# Patient Record
Sex: FEMALE | Race: BLACK OR AFRICAN AMERICAN | NOT HISPANIC OR LATINO | URBAN - METROPOLITAN AREA
[De-identification: names, ages, dates, MRNs, and addresses within clinical notes are randomized per-mention and may not be internally consistent; named-entity substitution may affect disease eponyms.]

---

## 2017-03-22 ENCOUNTER — EMERGENCY (EMERGENCY)
Facility: HOSPITAL | Age: 32
LOS: 1 days | Discharge: HOME | End: 2017-03-22
Attending: EMERGENCY MEDICINE | Admitting: EMERGENCY MEDICINE
Payer: COMMERCIAL

## 2017-03-22 VITALS
TEMPERATURE: 98 F | HEART RATE: 101 BPM | DIASTOLIC BLOOD PRESSURE: 75 MMHG | OXYGEN SATURATION: 96 % | SYSTOLIC BLOOD PRESSURE: 141 MMHG | RESPIRATION RATE: 18 BRPM

## 2017-03-22 VITALS
RESPIRATION RATE: 19 BRPM | SYSTOLIC BLOOD PRESSURE: 120 MMHG | OXYGEN SATURATION: 95 % | HEART RATE: 88 BPM | DIASTOLIC BLOOD PRESSURE: 75 MMHG | TEMPERATURE: 98 F

## 2017-03-22 DIAGNOSIS — Z79.899 OTHER LONG TERM (CURRENT) DRUG THERAPY: ICD-10-CM

## 2017-03-22 DIAGNOSIS — O26.893 OTHER SPECIFIED PREGNANCY RELATED CONDITIONS, THIRD TRIMESTER: ICD-10-CM

## 2017-03-22 DIAGNOSIS — Z88.8 ALLERGY STATUS TO OTHER DRUGS, MEDICAMENTS AND BIOLOGICAL SUBSTANCES STATUS: ICD-10-CM

## 2017-03-22 DIAGNOSIS — Z3A.40 40 WEEKS GESTATION OF PREGNANCY: ICD-10-CM

## 2017-03-22 DIAGNOSIS — J02.8 ACUTE PHARYNGITIS DUE TO OTHER SPECIFIED ORGANISMS: ICD-10-CM

## 2017-03-22 PROCEDURE — 87880 STREP A ASSAY W/OPTIC: CPT

## 2017-03-22 PROCEDURE — 99282 EMERGENCY DEPT VISIT SF MDM: CPT

## 2017-03-22 PROCEDURE — 99283 EMERGENCY DEPT VISIT LOW MDM: CPT

## 2017-03-22 PROCEDURE — 87081 CULTURE SCREEN ONLY: CPT

## 2017-03-22 NOTE — ED PROVIDER NOTE - OBJECTIVE STATEMENT
31 yo female 40 weeks GA, EDC 03/19/2017, in the ER c/o sore throat x 1 month. pt c/o pain with swallowing. Pt reports cough and chills at first that went away, but pain in her throat persist. Pt denies any SOB, chest pain, abdominal or pelvic pain, denies vaginal bleeding. Pt states that she has an appointment at 1 pm today with her OB/GYN, but since she came earlier decided to check her throat in ER.

## 2017-03-22 NOTE — ED ADULT NURSE NOTE - OBJECTIVE STATEMENT
40wks pregnant c/o sorethroat x 2 weeks.  Denies recent travels, fever, chills, drooling, abdo pain, bleeding, sob.  Speaking clearly in full sentences.

## 2017-03-22 NOTE — ED PROVIDER NOTE - MEDICAL DECISION MAKING DETAILS
33 yo female , EDC 16, in the ER with chronic sore throat x 1 month. Pt appears well, speaks in full sentences, and has no hoarseness, no drooling or stridor, no SOB, clear lungs. will check for strep , although unlikely and d.c home for further OB/GYN evaluation today as scheduled.

## 2017-03-23 ENCOUNTER — INPATIENT (INPATIENT)
Facility: HOSPITAL | Age: 32
LOS: 3 days | Discharge: HOME | End: 2017-03-27
Attending: OBSTETRICS & GYNECOLOGY | Admitting: OBSTETRICS & GYNECOLOGY
Payer: COMMERCIAL

## 2017-03-23 VITALS — WEIGHT: 279.99 LBS | HEIGHT: 65 IN

## 2017-03-23 RX ORDER — CITRIC ACID/SODIUM CITRATE 300-500 MG
30 SOLUTION, ORAL ORAL ONCE
Qty: 0 | Refills: 0 | Status: COMPLETED | OUTPATIENT
Start: 2017-03-23 | End: 2017-03-23

## 2017-03-23 RX ORDER — SODIUM CHLORIDE 9 MG/ML
1000 INJECTION, SOLUTION INTRAVENOUS
Qty: 0 | Refills: 0 | Status: DISCONTINUED | OUTPATIENT
Start: 2017-03-23 | End: 2017-03-24

## 2017-03-23 RX ORDER — METOCLOPRAMIDE HCL 10 MG
10 TABLET ORAL ONCE
Qty: 0 | Refills: 0 | Status: DISCONTINUED | OUTPATIENT
Start: 2017-03-23 | End: 2017-03-27

## 2017-03-23 RX ORDER — OXYCODONE HYDROCHLORIDE 5 MG/1
5 TABLET ORAL
Qty: 0 | Refills: 0 | Status: DISCONTINUED | OUTPATIENT
Start: 2017-03-23 | End: 2017-03-27

## 2017-03-23 RX ORDER — SODIUM CHLORIDE 9 MG/ML
1000 INJECTION, SOLUTION INTRAVENOUS
Qty: 0 | Refills: 0 | Status: DISCONTINUED | OUTPATIENT
Start: 2017-03-23 | End: 2017-03-27

## 2017-03-23 RX ORDER — CEFAZOLIN SODIUM 1 G
2000 VIAL (EA) INJECTION ONCE
Qty: 0 | Refills: 0 | Status: COMPLETED | OUTPATIENT
Start: 2017-03-23 | End: 2017-03-23

## 2017-03-23 RX ORDER — OXYCODONE HYDROCHLORIDE 5 MG/1
10 TABLET ORAL
Qty: 0 | Refills: 0 | Status: DISCONTINUED | OUTPATIENT
Start: 2017-03-23 | End: 2017-03-27

## 2017-03-23 RX ORDER — ONDANSETRON 8 MG/1
4 TABLET, FILM COATED ORAL EVERY 6 HOURS
Qty: 0 | Refills: 0 | Status: DISCONTINUED | OUTPATIENT
Start: 2017-03-23 | End: 2017-03-27

## 2017-03-23 RX ORDER — PENICILLIN G POTASSIUM 5000000 [IU]/1
POWDER, FOR SOLUTION INTRAMUSCULAR; INTRAPLEURAL; INTRATHECAL; INTRAVENOUS
Qty: 0 | Refills: 0 | Status: DISCONTINUED | OUTPATIENT
Start: 2017-03-23 | End: 2017-03-24

## 2017-03-23 RX ORDER — OXYTOCIN 10 UNIT/ML
333.33 VIAL (ML) INJECTION
Qty: 20 | Refills: 0 | Status: DISCONTINUED | OUTPATIENT
Start: 2017-03-23 | End: 2017-03-24

## 2017-03-23 RX ORDER — SODIUM CHLORIDE 9 MG/ML
1000 INJECTION, SOLUTION INTRAVENOUS ONCE
Qty: 0 | Refills: 0 | Status: DISCONTINUED | OUTPATIENT
Start: 2017-03-23 | End: 2017-03-24

## 2017-03-23 RX ORDER — HYDROMORPHONE HYDROCHLORIDE 2 MG/ML
0.5 INJECTION INTRAMUSCULAR; INTRAVENOUS; SUBCUTANEOUS
Qty: 0 | Refills: 0 | Status: DISCONTINUED | OUTPATIENT
Start: 2017-03-23 | End: 2017-03-27

## 2017-03-23 RX ORDER — NALOXONE HYDROCHLORIDE 4 MG/.1ML
0.1 SPRAY NASAL
Qty: 0 | Refills: 0 | Status: DISCONTINUED | OUTPATIENT
Start: 2017-03-23 | End: 2017-03-27

## 2017-03-23 RX ORDER — CITRIC ACID/SODIUM CITRATE 300-500 MG
30 SOLUTION, ORAL ORAL ONCE
Qty: 0 | Refills: 0 | Status: DISCONTINUED | OUTPATIENT
Start: 2017-03-23 | End: 2017-03-27

## 2017-03-23 RX ORDER — OXYTOCIN 10 UNIT/ML
1 VIAL (ML) INJECTION
Qty: 30 | Refills: 0 | Status: DISCONTINUED | OUTPATIENT
Start: 2017-03-23 | End: 2017-03-27

## 2017-03-23 RX ORDER — CITRIC ACID/SODIUM CITRATE 300-500 MG
15 SOLUTION, ORAL ORAL EVERY 4 HOURS
Qty: 0 | Refills: 0 | Status: DISCONTINUED | OUTPATIENT
Start: 2017-03-23 | End: 2017-03-24

## 2017-03-23 RX ORDER — PENICILLIN G POTASSIUM 5000000 [IU]/1
5 POWDER, FOR SOLUTION INTRAMUSCULAR; INTRAPLEURAL; INTRATHECAL; INTRAVENOUS ONCE
Qty: 0 | Refills: 0 | Status: COMPLETED | OUTPATIENT
Start: 2017-03-23 | End: 2017-03-23

## 2017-03-23 RX ORDER — PENICILLIN G POTASSIUM 5000000 [IU]/1
2.5 POWDER, FOR SOLUTION INTRAMUSCULAR; INTRAPLEURAL; INTRATHECAL; INTRAVENOUS EVERY 4 HOURS
Qty: 0 | Refills: 0 | Status: DISCONTINUED | OUTPATIENT
Start: 2017-03-23 | End: 2017-03-24

## 2017-03-23 RX ORDER — SODIUM CHLORIDE 9 MG/ML
1000 INJECTION, SOLUTION INTRAVENOUS ONCE
Qty: 0 | Refills: 0 | Status: DISCONTINUED | OUTPATIENT
Start: 2017-03-23 | End: 2017-03-27

## 2017-03-23 RX ADMIN — PENICILLIN G POTASSIUM 200 MILLION UNIT(S): 5000000 POWDER, FOR SOLUTION INTRAMUSCULAR; INTRAPLEURAL; INTRATHECAL; INTRAVENOUS at 19:11

## 2017-03-23 RX ADMIN — SODIUM CHLORIDE 125 MILLILITER(S): 9 INJECTION, SOLUTION INTRAVENOUS at 09:00

## 2017-03-23 RX ADMIN — PENICILLIN G POTASSIUM 200 MILLION UNIT(S): 5000000 POWDER, FOR SOLUTION INTRAMUSCULAR; INTRAPLEURAL; INTRATHECAL; INTRAVENOUS at 07:20

## 2017-03-23 RX ADMIN — PENICILLIN G POTASSIUM 200 MILLION UNIT(S): 5000000 POWDER, FOR SOLUTION INTRAMUSCULAR; INTRAPLEURAL; INTRATHECAL; INTRAVENOUS at 16:00

## 2017-03-23 RX ADMIN — Medication 30 MILLILITER(S): at 20:43

## 2017-03-23 RX ADMIN — PENICILLIN G POTASSIUM 200 MILLION UNIT(S): 5000000 POWDER, FOR SOLUTION INTRAMUSCULAR; INTRAPLEURAL; INTRATHECAL; INTRAVENOUS at 11:57

## 2017-03-23 RX ADMIN — Medication 1 MILLIUNIT(S)/MIN: at 11:07

## 2017-03-23 RX ADMIN — SODIUM CHLORIDE 125 MILLILITER(S): 9 INJECTION, SOLUTION INTRAVENOUS at 19:11

## 2017-03-23 RX ADMIN — Medication 100 MILLIGRAM(S): at 20:41

## 2017-03-24 RX ORDER — LANOLIN
1 OINTMENT (GRAM) TOPICAL
Qty: 0 | Refills: 0 | Status: DISCONTINUED | OUTPATIENT
Start: 2017-03-24 | End: 2017-03-27

## 2017-03-24 RX ORDER — IBUPROFEN 200 MG
600 TABLET ORAL EVERY 6 HOURS
Qty: 0 | Refills: 0 | Status: DISCONTINUED | OUTPATIENT
Start: 2017-03-24 | End: 2017-03-27

## 2017-03-24 RX ORDER — OXYTOCIN 10 UNIT/ML
41.67 VIAL (ML) INJECTION
Qty: 20 | Refills: 0 | Status: DISCONTINUED | OUTPATIENT
Start: 2017-03-24 | End: 2017-03-27

## 2017-03-24 RX ORDER — TETANUS TOXOID, REDUCED DIPHTHERIA TOXOID AND ACELLULAR PERTUSSIS VACCINE, ADSORBED 5; 2.5; 8; 8; 2.5 [IU]/.5ML; [IU]/.5ML; UG/.5ML; UG/.5ML; UG/.5ML
0.5 SUSPENSION INTRAMUSCULAR ONCE
Qty: 0 | Refills: 0 | Status: DISCONTINUED | OUTPATIENT
Start: 2017-03-24 | End: 2017-03-27

## 2017-03-24 RX ORDER — SIMETHICONE 80 MG/1
80 TABLET, CHEWABLE ORAL EVERY 4 HOURS
Qty: 0 | Refills: 0 | Status: DISCONTINUED | OUTPATIENT
Start: 2017-03-24 | End: 2017-03-27

## 2017-03-24 RX ORDER — OXYTOCIN 10 UNIT/ML
333.33 VIAL (ML) INJECTION
Qty: 20 | Refills: 0 | Status: DISCONTINUED | OUTPATIENT
Start: 2017-03-24 | End: 2017-03-27

## 2017-03-24 RX ORDER — ACETAMINOPHEN 500 MG
650 TABLET ORAL EVERY 6 HOURS
Qty: 0 | Refills: 0 | Status: DISCONTINUED | OUTPATIENT
Start: 2017-03-24 | End: 2017-03-27

## 2017-03-24 RX ORDER — DOCUSATE SODIUM 100 MG
100 CAPSULE ORAL
Qty: 0 | Refills: 0 | Status: DISCONTINUED | OUTPATIENT
Start: 2017-03-24 | End: 2017-03-27

## 2017-03-24 RX ORDER — GLYCERIN ADULT
1 SUPPOSITORY, RECTAL RECTAL AT BEDTIME
Qty: 0 | Refills: 0 | Status: DISCONTINUED | OUTPATIENT
Start: 2017-03-24 | End: 2017-03-27

## 2017-03-24 RX ORDER — SODIUM CHLORIDE 9 MG/ML
1000 INJECTION, SOLUTION INTRAVENOUS
Qty: 0 | Refills: 0 | Status: DISCONTINUED | OUTPATIENT
Start: 2017-03-24 | End: 2017-03-24

## 2017-03-24 RX ORDER — HEPARIN SODIUM 5000 [USP'U]/ML
5000 INJECTION INTRAVENOUS; SUBCUTANEOUS EVERY 12 HOURS
Qty: 0 | Refills: 0 | Status: DISCONTINUED | OUTPATIENT
Start: 2017-03-24 | End: 2017-03-27

## 2017-03-24 RX ORDER — OXYTOCIN 10 UNIT/ML
41.67 VIAL (ML) INJECTION
Qty: 20 | Refills: 0 | Status: DISCONTINUED | OUTPATIENT
Start: 2017-03-24 | End: 2017-03-24

## 2017-03-24 RX ORDER — HEPARIN SODIUM 5000 [USP'U]/ML
5000 INJECTION INTRAVENOUS; SUBCUTANEOUS EVERY 12 HOURS
Qty: 0 | Refills: 0 | Status: DISCONTINUED | OUTPATIENT
Start: 2017-03-24 | End: 2017-03-24

## 2017-03-24 RX ORDER — FERROUS SULFATE 325(65) MG
325 TABLET ORAL DAILY
Qty: 0 | Refills: 0 | Status: DISCONTINUED | OUTPATIENT
Start: 2017-03-24 | End: 2017-03-27

## 2017-03-24 RX ORDER — SODIUM CHLORIDE 9 MG/ML
1000 INJECTION, SOLUTION INTRAVENOUS
Qty: 0 | Refills: 0 | Status: DISCONTINUED | OUTPATIENT
Start: 2017-03-24 | End: 2017-03-27

## 2017-03-24 RX ADMIN — Medication 100 MILLIGRAM(S): at 09:31

## 2017-03-24 RX ADMIN — OXYCODONE HYDROCHLORIDE 5 MILLIGRAM(S): 5 TABLET ORAL at 22:00

## 2017-03-24 RX ADMIN — Medication 325 MILLIGRAM(S): at 11:54

## 2017-03-24 RX ADMIN — Medication 600 MILLIGRAM(S): at 23:58

## 2017-03-24 RX ADMIN — Medication 100 MILLIGRAM(S): at 21:41

## 2017-03-24 RX ADMIN — Medication 600 MILLIGRAM(S): at 09:31

## 2017-03-24 RX ADMIN — SODIUM CHLORIDE 125 MILLILITER(S): 9 INJECTION, SOLUTION INTRAVENOUS at 11:53

## 2017-03-24 RX ADMIN — HEPARIN SODIUM 5000 UNIT(S): 5000 INJECTION INTRAVENOUS; SUBCUTANEOUS at 09:31

## 2017-03-24 RX ADMIN — Medication 1 TABLET(S): at 09:31

## 2017-03-24 RX ADMIN — Medication 600 MILLIGRAM(S): at 16:50

## 2017-03-24 RX ADMIN — Medication 600 MILLIGRAM(S): at 10:15

## 2017-03-24 RX ADMIN — Medication 600 MILLIGRAM(S): at 17:30

## 2017-03-24 RX ADMIN — Medication 125 MILLIUNIT(S)/MIN: at 01:56

## 2017-03-24 RX ADMIN — HEPARIN SODIUM 5000 UNIT(S): 5000 INJECTION INTRAVENOUS; SUBCUTANEOUS at 22:00

## 2017-03-24 RX ADMIN — OXYCODONE HYDROCHLORIDE 5 MILLIGRAM(S): 5 TABLET ORAL at 21:48

## 2017-03-25 RX ADMIN — Medication 325 MILLIGRAM(S): at 10:13

## 2017-03-25 RX ADMIN — Medication 600 MILLIGRAM(S): at 13:27

## 2017-03-25 RX ADMIN — SIMETHICONE 80 MILLIGRAM(S): 80 TABLET, CHEWABLE ORAL at 07:15

## 2017-03-25 RX ADMIN — Medication 600 MILLIGRAM(S): at 23:00

## 2017-03-25 RX ADMIN — HEPARIN SODIUM 5000 UNIT(S): 5000 INJECTION INTRAVENOUS; SUBCUTANEOUS at 10:12

## 2017-03-25 RX ADMIN — Medication 600 MILLIGRAM(S): at 22:57

## 2017-03-25 RX ADMIN — Medication 600 MILLIGRAM(S): at 07:45

## 2017-03-25 RX ADMIN — Medication 600 MILLIGRAM(S): at 14:15

## 2017-03-25 RX ADMIN — Medication 1 TABLET(S): at 10:13

## 2017-03-25 RX ADMIN — Medication 600 MILLIGRAM(S): at 00:02

## 2017-03-25 RX ADMIN — Medication 600 MILLIGRAM(S): at 07:15

## 2017-03-25 RX ADMIN — HEPARIN SODIUM 5000 UNIT(S): 5000 INJECTION INTRAVENOUS; SUBCUTANEOUS at 22:57

## 2017-03-25 RX ADMIN — SIMETHICONE 80 MILLIGRAM(S): 80 TABLET, CHEWABLE ORAL at 00:05

## 2017-03-26 LAB
HCT VFR BLD CALC: 27.7 % — LOW (ref 34.5–45)
HGB BLD-MCNC: 9.6 G/DL — LOW (ref 11.5–15.5)
MCHC RBC-ENTMCNC: 31.1 PG — SIGNIFICANT CHANGE UP (ref 27–34)
MCHC RBC-ENTMCNC: 34.7 G/DL — SIGNIFICANT CHANGE UP (ref 32–36)
MCV RBC AUTO: 89.6 FL — SIGNIFICANT CHANGE UP (ref 80–100)
PLATELET # BLD AUTO: 176 K/UL — SIGNIFICANT CHANGE UP (ref 150–400)
RBC # BLD: 3.09 M/UL — LOW (ref 3.8–5.2)
RBC # FLD: 12.9 % — SIGNIFICANT CHANGE UP (ref 10.3–16.9)
WBC # BLD: 7.9 K/UL — SIGNIFICANT CHANGE UP (ref 3.8–10.5)
WBC # FLD AUTO: 7.9 K/UL — SIGNIFICANT CHANGE UP (ref 3.8–10.5)

## 2017-03-26 RX ADMIN — Medication 600 MILLIGRAM(S): at 23:59

## 2017-03-26 RX ADMIN — Medication 1 TABLET(S): at 11:12

## 2017-03-26 RX ADMIN — HEPARIN SODIUM 5000 UNIT(S): 5000 INJECTION INTRAVENOUS; SUBCUTANEOUS at 11:12

## 2017-03-26 RX ADMIN — HEPARIN SODIUM 5000 UNIT(S): 5000 INJECTION INTRAVENOUS; SUBCUTANEOUS at 23:46

## 2017-03-26 RX ADMIN — Medication 600 MILLIGRAM(S): at 23:55

## 2017-03-26 RX ADMIN — Medication 1 APPLICATION(S): at 11:12

## 2017-03-26 RX ADMIN — Medication 325 MILLIGRAM(S): at 11:12

## 2017-03-26 RX ADMIN — Medication 600 MILLIGRAM(S): at 23:00

## 2017-03-26 NOTE — PROGRESS NOTE ADULT - ATTENDING COMMENTS
Patient seen and examined. Reviewed wound care, follow up in office, breast feeding etc.   Plan for discharge to home tomorrow  Hemodynamically stable and afebrile.

## 2017-03-27 VITALS
HEART RATE: 80 BPM | OXYGEN SATURATION: 98 % | TEMPERATURE: 98 F | RESPIRATION RATE: 18 BRPM | DIASTOLIC BLOOD PRESSURE: 65 MMHG | SYSTOLIC BLOOD PRESSURE: 105 MMHG

## 2017-03-27 LAB
APPEARANCE UR: CLEAR — SIGNIFICANT CHANGE UP
BILIRUB UR-MCNC: NEGATIVE — SIGNIFICANT CHANGE UP
COLOR SPEC: YELLOW — SIGNIFICANT CHANGE UP
DIFF PNL FLD: (no result)
GLUCOSE UR QL: NEGATIVE — SIGNIFICANT CHANGE UP
KETONES UR-MCNC: NEGATIVE — SIGNIFICANT CHANGE UP
LEUKOCYTE ESTERASE UR-ACNC: NEGATIVE — SIGNIFICANT CHANGE UP
NITRITE UR-MCNC: NEGATIVE — SIGNIFICANT CHANGE UP
PH UR: 6.5 — SIGNIFICANT CHANGE UP (ref 4–8)
PROT UR-MCNC: NEGATIVE MG/DL — SIGNIFICANT CHANGE UP
SP GR SPEC: <=1.005 — SIGNIFICANT CHANGE UP (ref 1–1.03)
UROBILINOGEN FLD QL: 0.2 E.U./DL — SIGNIFICANT CHANGE UP

## 2017-03-27 PROCEDURE — 85025 COMPLETE CBC W/AUTO DIFF WBC: CPT

## 2017-03-27 PROCEDURE — C1889: CPT

## 2017-03-27 PROCEDURE — 83615 LACTATE (LD) (LDH) ENZYME: CPT

## 2017-03-27 PROCEDURE — 80053 COMPREHEN METABOLIC PANEL: CPT

## 2017-03-27 PROCEDURE — 81001 URINALYSIS AUTO W/SCOPE: CPT

## 2017-03-27 PROCEDURE — 86850 RBC ANTIBODY SCREEN: CPT

## 2017-03-27 PROCEDURE — 87086 URINE CULTURE/COLONY COUNT: CPT

## 2017-03-27 PROCEDURE — 85027 COMPLETE CBC AUTOMATED: CPT

## 2017-03-27 PROCEDURE — 85730 THROMBOPLASTIN TIME PARTIAL: CPT

## 2017-03-27 PROCEDURE — 85384 FIBRINOGEN ACTIVITY: CPT

## 2017-03-27 PROCEDURE — 36415 COLL VENOUS BLD VENIPUNCTURE: CPT

## 2017-03-27 PROCEDURE — 86780 TREPONEMA PALLIDUM: CPT

## 2017-03-27 PROCEDURE — 84550 ASSAY OF BLOOD/URIC ACID: CPT

## 2017-03-27 PROCEDURE — 86900 BLOOD TYPING SEROLOGIC ABO: CPT

## 2017-03-27 PROCEDURE — 81003 URINALYSIS AUTO W/O SCOPE: CPT

## 2017-03-27 PROCEDURE — 99214 OFFICE O/P EST MOD 30 MIN: CPT

## 2017-03-27 PROCEDURE — 86901 BLOOD TYPING SEROLOGIC RH(D): CPT

## 2017-03-27 PROCEDURE — 88307 TISSUE EXAM BY PATHOLOGIST: CPT

## 2017-03-27 PROCEDURE — 85610 PROTHROMBIN TIME: CPT

## 2017-03-27 RX ADMIN — Medication 325 MILLIGRAM(S): at 12:23

## 2017-03-27 RX ADMIN — Medication 600 MILLIGRAM(S): at 13:40

## 2017-03-27 RX ADMIN — Medication 600 MILLIGRAM(S): at 12:23

## 2017-03-27 RX ADMIN — Medication 1 TABLET(S): at 12:23

## 2017-03-27 RX ADMIN — Medication 600 MILLIGRAM(S): at 06:55

## 2017-03-27 RX ADMIN — Medication 600 MILLIGRAM(S): at 06:00

## 2017-03-27 NOTE — DISCHARGE NOTE OB - PATIENT PORTAL LINK FT
“You can access the FollowHealth Patient Portal, offered by [facility], by registering with the following website: [email or web address]”

## 2017-03-27 NOTE — DISCHARGE NOTE OB - MEDICATION SUMMARY - MEDICATIONS TO TAKE
I will START or STAY ON the medications listed below when I get home from the hospital:    Percocet 5/325 oral tablet  -- 1-2 tab(s) by mouth every 6 hours, As Needed -for severe pain MDD:8 tabs  -- Caution federal law prohibits the transfer of this drug to any person other  than the person for whom it was prescribed.  May cause drowsiness.  Alcohol may intensify this effect.  Use care when operating dangerous machinery.  This prescription cannot be refilled.  This product contains acetaminophen.  Do not use  with any other product containing acetaminophen to prevent possible liver damage.  Using more of this medication than prescribed may cause serious breathing problems.    -- Indication: For Pain

## 2017-03-27 NOTE — DISCHARGE NOTE OB - CARE PROVIDER_API CALL
Beryl Goldberg), [facility]  [address] [place] [address]  Phone: [phone]  Fax: [phone]

## 2017-03-27 NOTE — DISCHARGE NOTE OB - PLAN OF CARE
healing Follow up with your doctor in 1-2 wks. Call her if you have severe pain/bleeding or fever >100.4.

## 2017-03-27 NOTE — DISCHARGE NOTE OB - CARE PLAN
Principal Discharge DX:	 delivery delivered  Goal:	healing  Instructions for follow-up, activity and diet:	Follow up with your doctor in 1-2 wks. Call her if you have severe pain/bleeding or fever >100.4.

## 2017-03-27 NOTE — PROGRESS NOTE ADULT - SUBJECTIVE AND OBJECTIVE BOX
Patient evaluated at bedside.   She reports pain is well controlled with Motrin.  She denies headache, dizziness, chest pain, palpitations, shortness of breathe, nausea, vomiting or heavy vaginal bleeding.  She has been ambulating without assistance, voiding spontaneously, passing gas, tolerating regular diet and is breastfeeding.    Physical Exam:  Vital Signs Last 24 Hrs  T(C): 36.7, Max: 36.7 (03-25 @ 10:00)  T(F): 98, Max: 978 (03-25 @ 20:39)  HR: 91 (85 - 94)  BP: 122/78 (108/71 - 136/63)  BP(mean): --  RR: 14 (14 - 16)  SpO2: 97% (97% - 100%)      GA: NAD, A+0 x 3  CV: RRR  Pulm: Normal work of breathing  Breasts: soft, nontender, no palpable masses  Abd: + BS, soft, nontender, nondistended, no rebound or guarding, uterus firm at midline, 1 fb below umbilicus  Incision: MARLYS dressing in place, c/d/i  : lochia WNL  Extremities: no swelling or calf tenderness
Patient evaluated at bedside.   She reports pain is well controlled with PO meds. She denies headache, dizziness, chest pain, palpitations, shortness of breathe, nausea, vomiting or heavy vaginal bleeding.  She has been out bed with assistance,  passing gas, tolerating regular diet and is breastfeeding.    Physical Exam:  Vital Signs Last 24 Hrs  T(C): 36.6, Max: 37.5 (03-24 @ 00:04)  T(F): 97.9, Max: 99.5 (03-24 @ 00:04)  HR: 87 (78 - 97)  BP: 115/57 (99/64 - 149/88)  BP(mean): --  RR: 16 (16 - 18)  SpO2: 96% (94% - 97%)    GA: NAD, A+0 x 3   Breasts: soft, nontender, no palpable masses b/l  Abd: + BS, soft, nontender, nondistended, no rebound or guarding, incision clean, dry and intact, uterus firm at midline, 1 b below umbilicus  : lochia WNL  Iniguez: d/c  Extremities: no swelling or calf tenderness, reflexes +2 bilaterally    LABS:                        12.5   10.8  )-----------( 242      ( 23 Mar 2017 06:47 )             35.4     23 Mar 2017 06:47    135    |  102    |  8      ----------------------------<  90     3.7     |  20     |  0.45     Ca    9.0        23 Mar 2017 06:47    TPro  7.2    /  Alb  2.7    /  TBili  0.2    /  DBili  x      /  AST  13     /  ALT  20     /  AlkPhos  148    23 Mar 2017 06:47      PT/INR - ( 23 Mar 2017 06:48 )   PT: 10.1 sec;   INR: 0.91          PTT - ( 23 Mar 2017 06:48 )  PTT:28.1 sec
Patient evaluated at bedside.   She reports pain is well controlled with PO motrin.  She denies nausea, vomiting or heavy vaginal bleeding.  She has been ambulating without assistance, voiding spontaneously, passing gas, tolerating regular diet and is breastfeeding.    Physical Exam:  Vital Signs Last 24 Hrs  T(C): 36.7, Max: 36.8 (03-26 @ 14:00)  T(F): 98, Max: 98.2 (03-26 @ 14:00)  HR: 84 (84 - 99)  BP: 122/78 (112/72 - 122/78)  BP(mean): --  RR: 14 (14 - 16)  SpO2: 97% (97% - 99%)    GA: NAD, A+0 x 3  Abd: + BS, soft, nontender, nondistended, no rebound or guarding, incision c/d/i with MARLYS in place, uterus firm at midline, 2 fb below umbilicus  : lochia WNL  Extremities: no swelling or calf tenderness                            9.6    7.9   )-----------( 176      ( 26 Mar 2017 22:39 )             27.7
Patient evaluated at bedside.   She reports pain is well controlled with PO motrin.  She has passed gas but less so over the past day and currently feels more distended.  She denies nausea and vomiting.  Tolerating clear fluids without issue.  Bleeding well controlled.    She has been ambulating without assistance and is voiding spontaneously.    Physical Exam:  Vital Signs Last 24 Hrs  T(C): 36.7, Max: 36.7 (03-24 @ 16:45)  T(F): 98, Max: 98.1 (03-24 @ 16:45)  HR: 88 (74 - 88)  BP: 121/60 (99/64 - 121/60)  BP(mean): --  RR: 14 (14 - 18)  SpO2: 98% (95% - 98%)    GA: NAD, A+0 x 3  Abd:  soft, nontender, nondistended, no rebound or guarding, MARLYS dressing in place c/d/i functioning well, non erythematous and nonindurated.  Uterus firm at midline 2 fb from umbilicus.  : lochia WNL  Extremities: no swelling or calf tenderness                            12.5   10.8  )-----------( 242      ( 23 Mar 2017 06:47 )             35.4     23 Mar 2017 06:47    135    |  102    |  8      ----------------------------<  90     3.7     |  20     |  0.45     Ca    9.0        23 Mar 2017 06:47    TPro  7.2    /  Alb  2.7    /  TBili  0.2    /  DBili  x      /  AST  13     /  ALT  20     /  AlkPhos  148    23 Mar 2017 06:47      PT/INR - ( 23 Mar 2017 06:48 )   PT: 10.1 sec;   INR: 0.91          PTT - ( 23 Mar 2017 06:48 )  PTT:28.1 sec
Patient evaluated at bedside.   She reports pain is well controlled at this time, has not yet required PO medication  She denies nausea, vomiting or heavy vaginal bleeding.  She has passed gas and is tolerating clear fluids.  She has her paige in situ and has note yet voided or ambulated.    Physical Exam:  Vital Signs Last 24 Hrs  T(C): 36.6, Max: 37.5 (03-24 @ 00:04)  T(F): 97.8, Max: 99.5 (03-24 @ 00:04)  HR: 78 (78 - 97)  BP: 99/64 (99/64 - 149/88)  BP(mean): --  RR: 18 (16 - 18)  SpO2: 95% (94% - 97%)    GA: NAD, A+0 x 3  Abd: + BS, soft, nontender, nondistended, no rebound or guarding, MARLYS dressing over incision c/d/i, uterus firm at midline, 1 fb below umbilicus  : lochia WNL  Paige: in situ  Extremities: no swelling or calf tenderness                            12.5   10.8  )-----------( 242      ( 23 Mar 2017 06:47 )             35.4     23 Mar 2017 06:47    135    |  102    |  8      ----------------------------<  90     3.7     |  20     |  0.45     Ca    9.0        23 Mar 2017 06:47    TPro  7.2    /  Alb  2.7    /  TBili  0.2    /  DBili  x      /  AST  13     /  ALT  20     /  AlkPhos  148    23 Mar 2017 06:47      PT/INR - ( 23 Mar 2017 06:48 )   PT: 10.1 sec;   INR: 0.91          PTT - ( 23 Mar 2017 06:48 )  PTT:28.1 sec

## 2017-03-27 NOTE — PROGRESS NOTE ADULT - ASSESSMENT
A/P   32y  s/p c/s, POD #1, stable  -  Pain: PO motrin, percocet  -  GI: tolerating clears, passing gas, ADAT  -  : paige in situ; DC this AM, f/u TOV  -  DVT prophylaxis: ambulation, SCDs, SQH  -  Dispo: POD 3 or 4
32y Female POD#1   s/p C/S, Uncomplicated  for arrest of dilation and cat 2 tracing                                     1. Neuro/Pain:  OPM  2  CV:  VS per routine  3. Pulm: Encourage ISS & Ambulation  4. GI:  Advance as jessica  5. : voiding  6. DVT ppx: SCDs, SQH 5000 mg BID  7. Dispo: POD #3 or #4
A/P   32y  s/p c/s, POD #2, stable  -  Pain: PO motrin, percocet  -  GI: reg diet  -  : s/p paige, voiding spontaneously  -  DVT prophylaxis: ambulation, SCDs, SQH  -  Dispo: POD 3 or 4
A/P   32y  s/p c/s, POD #4, stable  -  Pain: PO motrin, percocet  -  GI: reg diet  -  : s/p paige, voiding spontaneously  -  DVT prophylaxis: ambulation, SCDs, SQH  -  Dispo: POD 3 or 4; will return to office this week to remove MARLYS, discuss contraceptive options (interested in LARC)
A/P  32y  s/p , POD #3, stable  1. Pain: Motrin or Percocet prn  2. GI: Reg diet  3. : Voiding  4. DVT prophylaxis: SQH 5000u q12  5. Dispo: POD3 or 4

## 2017-03-28 LAB
CULTURE RESULTS: NO GROWTH — SIGNIFICANT CHANGE UP
SPECIMEN SOURCE: SIGNIFICANT CHANGE UP

## 2017-03-29 DIAGNOSIS — Z34.83 ENCOUNTER FOR SUPERVISION OF OTHER NORMAL PREGNANCY, THIRD TRIMESTER: ICD-10-CM

## 2017-03-29 DIAGNOSIS — Z3A.40 40 WEEKS GESTATION OF PREGNANCY: ICD-10-CM

## 2017-03-29 DIAGNOSIS — O48.0 POST-TERM PREGNANCY: ICD-10-CM

## 2017-03-29 DIAGNOSIS — O32.4XX0 MATERNAL CARE FOR HIGH HEAD AT TERM, NOT APPLICABLE OR UNSPECIFIED: ICD-10-CM

## 2017-03-29 LAB — SURGICAL PATHOLOGY STUDY: SIGNIFICANT CHANGE UP

## 2017-04-03 DIAGNOSIS — O36.63X0 MATERNAL CARE FOR EXCESSIVE FETAL GROWTH, THIRD TRIMESTER, NOT APPLICABLE OR UNSPECIFIED: ICD-10-CM

## 2017-04-21 NOTE — ED PROVIDER NOTE - ENMT, MLM
no
Airway patent, Nasal mucosa clear. Mouth with normal mucosa. Throat has no vesicles, no oropharyngeal exudates and uvula is midline, mild pharyngeal erythema+,

## 2017-07-19 ENCOUNTER — EMERGENCY (EMERGENCY)
Facility: HOSPITAL | Age: 32
LOS: 1 days | Discharge: HOME | End: 2017-07-19
Admitting: EMERGENCY MEDICINE
Payer: COMMERCIAL

## 2017-07-19 VITALS
SYSTOLIC BLOOD PRESSURE: 150 MMHG | TEMPERATURE: 99 F | OXYGEN SATURATION: 98 % | RESPIRATION RATE: 18 BRPM | DIASTOLIC BLOOD PRESSURE: 88 MMHG | HEART RATE: 75 BPM

## 2017-07-19 DIAGNOSIS — Z88.8 ALLERGY STATUS TO OTHER DRUGS, MEDICAMENTS AND BIOLOGICAL SUBSTANCES STATUS: ICD-10-CM

## 2017-07-19 DIAGNOSIS — Z79.891 LONG TERM (CURRENT) USE OF OPIATE ANALGESIC: ICD-10-CM

## 2017-07-19 DIAGNOSIS — H10.9 UNSPECIFIED CONJUNCTIVITIS: ICD-10-CM

## 2017-07-19 DIAGNOSIS — H57.8 OTHER SPECIFIED DISORDERS OF EYE AND ADNEXA: ICD-10-CM

## 2017-07-19 PROCEDURE — 99282 EMERGENCY DEPT VISIT SF MDM: CPT

## 2017-07-19 RX ORDER — MOXIFLOXACIN HCL 0.5 %
1 DROPS OPHTHALMIC (EYE)
Qty: 1 | Refills: 0 | OUTPATIENT
Start: 2017-07-19 | End: 2017-07-26

## 2017-07-19 NOTE — ED PROVIDER NOTE - MEDICAL DECISION MAKING DETAILS
31 yo female in the ER with b/l eyes irritation, erythema x 2 days. had small eyelids edema and discharge that started after eyelash extension. No vision changes, no URI symptoms. most likely conjunctivitis, as pt's son has similar symptoms today. plan : opthalmic abx and d/c for out pt f/u

## 2017-07-19 NOTE — ED PROVIDER NOTE - OBJECTIVE STATEMENT
33 yo female in the ER c/o b/l eye redness with some yellowish discharge, irritation for the past 2 days.  Pt had eyelash extension placed 2 days prior to that. Noted improvement today, but her baby boy has left eye redness with discharge on eyelids and swelling. Pt reports that she had eyelashes extension done multiple times , but never had similar symptoms. Denies any URI symptoms. denies h/o GChlamydia.

## 2017-07-19 NOTE — ED PROVIDER NOTE - EYES, MLM
Clear bilaterally, pupils equal, round and reactive to light.  mild conjunctival injection, watery discharge, EOMI,

## 2017-07-19 NOTE — ED ADULT NURSE NOTE - OBJECTIVE STATEMENT
bilateral eye redness yesterday which has resolved today.  Denies vision changes, fever, chills, recent travels.

## 2018-04-11 NOTE — PATIENT PROFILE OB - NS PRO REASONS FOR NOT BREASTFEEDING
Increase the Toprol XL (metoprolol succinate) back to a full tablet or 25 mg.  This is for the pumping of your heart, the heart arrhythmias and the blood pressure.     Start lisinopril 2.5 mg once daily.  This is for the pumping of your heart, blood pressure and because you have diabetes.     [facility] your medication [facility] from [facility] will need to [facility] the med list.     Quit smoking completely.     Limit the sodium in your diet.     Weigh yourself daily, so you can see if there is any weight changes.     See me in 1 month with a lab test before.   
The mother chose not to exclusively breastfeed upon admission.

## 2019-09-03 NOTE — PATIENT PROFILE OB - BREASTFEEDING PROVIDES STABLE TEMPERATURE THROUGH SKIN TO SKIN CONTACT
Today September 3, 2019 you received the    Hepatitis A Vaccine - Please return on 3/1/20 or later for your 2nd and final dose.    Yellow Fever (YF)    Typhoid - Oral. This prescription has been faxed to your pharmacy, please take as directed.   .    These appointments can be made as a NURSE ONLY visit.    **It is very important for the vaccinations to be given on the scheduled day(s), this helps ensure you receive the full effectiveness of the vaccine.**    Please call [facility] with any questions [phone]    Thank you for visiting [facility]      
Statement Selected

## 2020-10-14 NOTE — PATIENT PROFILE OB - PRO PRENATAL LABS ORI SOURCE RUBELLA
[FreeTextEntry1] : here to follow ongoing conditions and check INR on warfarin [de-identified] : shortness of breath much better since PPI\par past pulmonary embolus and deep venous thrombosis on lifelong warfarin\par still limited in walking by her legs not by her breathing\par nuclear stress test October 2016 showed normal left ventricular ejection fraction and no ischemia done at [facility]\par HTN well controlled to follow today\par INR 3.1 today\par scab left ankle, saw derm, told of basal cell, to get RT\par every week or so gets sudden episode diarrhea, can get to bathroom fast enough\par not really having constipation, but her bowel movements arent the same way they used to be\par she is worried she could have colon cancer hard copy

## 2021-03-29 ENCOUNTER — EMERGENCY (EMERGENCY)
Facility: HOSPITAL | Age: 36
LOS: 1 days | Discharge: HOME | End: 2021-03-29
Attending: EMERGENCY MEDICINE | Admitting: EMERGENCY MEDICINE
Payer: COMMERCIAL

## 2021-03-29 VITALS
HEART RATE: 76 BPM | HEIGHT: 65 IN | SYSTOLIC BLOOD PRESSURE: 140 MMHG | DIASTOLIC BLOOD PRESSURE: 78 MMHG | OXYGEN SATURATION: 100 % | TEMPERATURE: 99 F | WEIGHT: 279.99 LBS | RESPIRATION RATE: 17 BRPM

## 2021-03-29 VITALS
TEMPERATURE: 98 F | HEART RATE: 83 BPM | SYSTOLIC BLOOD PRESSURE: 174 MMHG | RESPIRATION RATE: 18 BRPM | DIASTOLIC BLOOD PRESSURE: 102 MMHG | OXYGEN SATURATION: 100 %

## 2021-03-29 DIAGNOSIS — O20.0 THREATENED ABORTION: ICD-10-CM

## 2021-03-29 DIAGNOSIS — Z3A.01 LESS THAN 8 WEEKS GESTATION OF PREGNANCY: ICD-10-CM

## 2021-03-29 DIAGNOSIS — Z88.8 ALLERGY STATUS TO OTHER DRUGS, MEDICAMENTS AND BIOLOGICAL SUBSTANCES: ICD-10-CM

## 2021-03-29 DIAGNOSIS — O99.891 OTHER SPECIFIED DISEASES AND CONDITIONS COMPLICATING PREGNANCY: ICD-10-CM

## 2021-03-29 DIAGNOSIS — O20.8 OTHER HEMORRHAGE IN EARLY PREGNANCY: ICD-10-CM

## 2021-03-29 DIAGNOSIS — Z79.899 OTHER LONG TERM (CURRENT) DRUG THERAPY: ICD-10-CM

## 2021-03-29 DIAGNOSIS — R11.0 NAUSEA: ICD-10-CM

## 2021-03-29 LAB
ALBUMIN SERPL ELPH-MCNC: 3.9 G/DL — SIGNIFICANT CHANGE UP (ref 3.3–5)
ALP SERPL-CCNC: 60 U/L — SIGNIFICANT CHANGE UP (ref 40–120)
ALT FLD-CCNC: 15 U/L — SIGNIFICANT CHANGE UP (ref 10–45)
ANION GAP SERPL CALC-SCNC: 13 MMOL/L — SIGNIFICANT CHANGE UP (ref 5–17)
AST SERPL-CCNC: 17 U/L — SIGNIFICANT CHANGE UP (ref 10–40)
BASOPHILS # BLD AUTO: 0.02 K/UL — SIGNIFICANT CHANGE UP (ref 0–0.2)
BASOPHILS NFR BLD AUTO: 0.2 % — SIGNIFICANT CHANGE UP (ref 0–2)
BILIRUB SERPL-MCNC: 0.2 MG/DL — SIGNIFICANT CHANGE UP (ref 0.2–1.2)
BUN SERPL-MCNC: 9 MG/DL — SIGNIFICANT CHANGE UP (ref 7–23)
CALCIUM SERPL-MCNC: 9.5 MG/DL — SIGNIFICANT CHANGE UP (ref 8.4–10.5)
CHLORIDE SERPL-SCNC: 100 MMOL/L — SIGNIFICANT CHANGE UP (ref 96–108)
CO2 SERPL-SCNC: 23 MMOL/L — SIGNIFICANT CHANGE UP (ref 22–31)
CREAT SERPL-MCNC: 0.53 MG/DL — SIGNIFICANT CHANGE UP (ref 0.5–1.3)
EOSINOPHIL # BLD AUTO: 0.04 K/UL — SIGNIFICANT CHANGE UP (ref 0–0.5)
EOSINOPHIL NFR BLD AUTO: 0.4 % — SIGNIFICANT CHANGE UP (ref 0–6)
GLUCOSE SERPL-MCNC: 102 MG/DL — HIGH (ref 70–99)
HCG SERPL-ACNC: HIGH MIU/ML
HCT VFR BLD CALC: 39.2 % — SIGNIFICANT CHANGE UP (ref 34.5–45)
HGB BLD-MCNC: 12.8 G/DL — SIGNIFICANT CHANGE UP (ref 11.5–15.5)
IMM GRANULOCYTES NFR BLD AUTO: 0.4 % — SIGNIFICANT CHANGE UP (ref 0–1.5)
LYMPHOCYTES # BLD AUTO: 1.94 K/UL — SIGNIFICANT CHANGE UP (ref 1–3.3)
LYMPHOCYTES # BLD AUTO: 20.3 % — SIGNIFICANT CHANGE UP (ref 13–44)
MCHC RBC-ENTMCNC: 30 PG — SIGNIFICANT CHANGE UP (ref 27–34)
MCHC RBC-ENTMCNC: 32.7 GM/DL — SIGNIFICANT CHANGE UP (ref 32–36)
MCV RBC AUTO: 91.8 FL — SIGNIFICANT CHANGE UP (ref 80–100)
MONOCYTES # BLD AUTO: 0.62 K/UL — SIGNIFICANT CHANGE UP (ref 0–0.9)
MONOCYTES NFR BLD AUTO: 6.5 % — SIGNIFICANT CHANGE UP (ref 2–14)
NEUTROPHILS # BLD AUTO: 6.91 K/UL — SIGNIFICANT CHANGE UP (ref 1.8–7.4)
NEUTROPHILS NFR BLD AUTO: 72.2 % — SIGNIFICANT CHANGE UP (ref 43–77)
NRBC # BLD: 0 /100 WBCS — SIGNIFICANT CHANGE UP (ref 0–0)
PLATELET # BLD AUTO: 199 K/UL — SIGNIFICANT CHANGE UP (ref 150–400)
POTASSIUM SERPL-MCNC: 3.7 MMOL/L — SIGNIFICANT CHANGE UP (ref 3.5–5.3)
POTASSIUM SERPL-SCNC: 3.7 MMOL/L — SIGNIFICANT CHANGE UP (ref 3.5–5.3)
PROT SERPL-MCNC: 7.7 G/DL — SIGNIFICANT CHANGE UP (ref 6–8.3)
RBC # BLD: 4.27 M/UL — SIGNIFICANT CHANGE UP (ref 3.8–5.2)
RBC # FLD: 13.1 % — SIGNIFICANT CHANGE UP (ref 10.3–14.5)
SODIUM SERPL-SCNC: 136 MMOL/L — SIGNIFICANT CHANGE UP (ref 135–145)
WBC # BLD: 9.57 K/UL — SIGNIFICANT CHANGE UP (ref 3.8–10.5)
WBC # FLD AUTO: 9.57 K/UL — SIGNIFICANT CHANGE UP (ref 3.8–10.5)

## 2021-03-29 PROCEDURE — 99285 EMERGENCY DEPT VISIT HI MDM: CPT

## 2021-03-29 PROCEDURE — 76815 OB US LIMITED FETUS(S): CPT | Mod: 26

## 2021-03-29 PROCEDURE — 99284 EMERGENCY DEPT VISIT MOD MDM: CPT | Mod: 25

## 2021-03-29 PROCEDURE — 76802 OB US < 14 WKS ADDL FETUS: CPT

## 2021-03-29 PROCEDURE — 76815 OB US LIMITED FETUS(S): CPT

## 2021-03-29 PROCEDURE — 76817 TRANSVAGINAL US OBSTETRIC: CPT | Mod: 26

## 2021-03-29 PROCEDURE — 84702 CHORIONIC GONADOTROPIN TEST: CPT

## 2021-03-29 PROCEDURE — 76817 TRANSVAGINAL US OBSTETRIC: CPT

## 2021-03-29 PROCEDURE — 80053 COMPREHEN METABOLIC PANEL: CPT

## 2021-03-29 PROCEDURE — 36415 COLL VENOUS BLD VENIPUNCTURE: CPT

## 2021-03-29 PROCEDURE — 85025 COMPLETE CBC W/AUTO DIFF WBC: CPT

## 2021-03-29 NOTE — ED ADULT TRIAGE NOTE - CHIEF COMPLAINT QUOTE
c/o vaginal spotting since yesterday.  Patient took the pregnancy test 2 days ago and states it was positive.  LMP 1/2021

## 2021-03-29 NOTE — ED PROVIDER NOTE - PATIENT PORTAL LINK FT
You can access the FollowMyHealth Patient Portal offered by [facility] by registering at the following website: [email or web address]. By joining [facility]’s FollowMyHealth portal, you will also be able to view your health information using other applications (apps) compatible with our system.

## 2021-03-29 NOTE — ED PROVIDER NOTE - CLINICAL SUMMARY MEDICAL DECISION MAKING FREE TEXT BOX
spotting in early pregnancy. no current bleeding or pain. no prenatal care so labs/us done to eval ectopic/ threatened . shows twin gestation with small subchorionic hematoma. rh+ from prior visit. results discussed with patient. reports she has obgyn for followup, will call for appointment. return precautions discussed

## 2021-03-29 NOTE — ED ADULT NURSE NOTE - OBJECTIVE STATEMENT
Pt is a 37 y/o female A&Ox4 in NAD ambulatory with steady gait c/o nausea x 2 weeks and vaginal bleeding yesterday. Pt reports +pregnancy test at home, LMP in January, states "I am irregular because I stopped my birth control." Pt reports no bleeding today. Pt denies pelvic pain, V/D, fever/chills.

## 2021-03-29 NOTE — ED PROVIDER NOTE - OBJECTIVE STATEMENT
, lmp end of January but reports irregular, had positive pregnancy test at home, here with spotting 2 days ago. Denies pain, current bleeding. Reports she felt nausea x 2 weeks prompting her to take pregnancy test at home. No fever/chills, vomiting, diarrhea.

## 2021-03-29 NOTE — ED PROVIDER NOTE - CARE PLAN
Principal Discharge DX:	Threatened  in first trimester  Secondary Diagnosis:	Subchorionic hematoma

## 2021-03-29 NOTE — ED PROVIDER NOTE - NSFOLLOWUPINSTRUCTIONS_ED_ALL_ED_FT
Please see your obgyn this week for followup.  Call for appointment.  If you have any problems with followup, please call the ED Referral Coordinator at [phone].  Return to the ER if symptoms worsen or other concerns.    Threatened Miscarriage    WHAT YOU NEED TO KNOW:    A threatened miscarriage occurs when you have vaginal bleeding within the first 20 weeks of pregnancy. It means that a miscarriage may happen. A threatened miscarriage may also be called a threatened .     DISCHARGE INSTRUCTIONS:    Return to the emergency department if:     You feel weak or faint.       Your pain or cramping in your abdomen or back gets worse.       You have vaginal bleeding that soaks 1 or more pads in an hour.       You pass material that looks like tissue or large clots.     Contact your healthcare provider or obstetrician if:     You have a fever.       You have trouble urinating, burning when you urinate, or feel a need to urinate often.      You have new or worsening vaginal bleeding.      You have vaginal pain or itching, or vaginal discharge that is yellow, green, or foul-smelling.       You have questions or concerns about your condition or care.    Self-care: The following may help you manage your symptoms and decrease your risk for a miscarriage:     Do not put anything in your vagina. Do not have sex, douche, or use tampons. These actions may increase your risk for infection and miscarriage.       Rest as directed. Do not exercise or do strenuous activities. These activities may cause  labor or miscarriage. Ask your healthcare provider what activities are okay to do.     Stay healthy during pregnancy:     Eat a variety of healthy foods. Healthy foods can help you get extra protein, water, and calories that you need while you are pregnant. Healthy foods include fruits, vegetables, whole-grain breads, low-fat dairy products, beans, lean meats, and fish. Avoid raw or undercooked meat and fish. Ask your healthcare provider if you need a special diet.       Take prenatal vitamins as directed. These help you get the right amount of vitamins and minerals. They may also decrease the risk of certain birth defects.      Do not drink alcohol or use illegal drugs. These can increase your risk for a miscarriage or harm your baby.       Do not smoke. Nicotine and other chemicals in cigarettes and cigars can harm your baby and cause miscarriage or  labor. Ask your healthcare provider for information if you currently smoke and need help to quit. E-cigarettes or smokeless tobacco still contain nicotine. Do not use these products.       Decrease your risk for an infection. Always wash your hands before eating or preparing meals. Do not spend time with people who are sick. Ask your healthcare provider if you need immunizations such as the flu or hepatitis B vaccine. Immunizations may decrease your risk for infections that could cause a miscarriage.       Manage your medical conditions. Keep your blood pressure and blood sugars under control. Maintain a healthy weight during pregnancy.     Follow up with your obstetrician as directed: You may need to see your obstetrician frequently for ultrasounds or blood tests. Write down your questions so you remember to ask them during your visits.      Subchorionic Hematoma       A subchorionic hematoma is a gathering of blood between the outer wall of the embryo (chorion) and the inner wall of the womb (uterus).  This condition can cause vaginal bleeding. If they cause little or no vaginal bleeding, early small hematomas usually shrink on their own and do not affect your baby or pregnancy. When bleeding starts later in pregnancy, or if the hematoma is larger or occurs in older pregnant women, the condition may be more serious. Larger hematomas may get bigger, which increases the chances of miscarriage. This condition also increases the risk of:  •Premature separation of the placenta from the uterus.      •Premature () labor.      •Stillbirth.        What are the causes?    The exact cause of this condition is not known. It occurs when blood is trapped between the placenta and the uterine wall because the placenta has  from the original site of implantation.      What increases the risk?  You are more likely to develop this condition if:  •You were treated with fertility medicines.      •You conceived through in vitro fertilization (IVF).        What are the signs or symptoms?  Symptoms of this condition include:  •Vaginal spotting or bleeding.      •Contractions of the uterus. These cause abdominal pain.      Sometimes you may have no symptoms and the bleeding may only be seen when ultrasound images are taken (transvaginal ultrasound).      How is this diagnosed?  This condition is diagnosed based on a physical exam. This includes a pelvic exam. You may also have other tests, including:  •Blood tests.      •Urine tests.      •Ultrasound of the abdomen.        How is this treated?  Treatment for this condition can vary. Treatment may include:•Watchful waiting. You will be monitored closely for any changes in bleeding. During this stage:  •The hematoma may be reabsorbed by the body.      •The hematoma may separate the fluid-filled space containing the embryo (gestational sac) from the wall of the womb (endometrium).        •Medicines.      •Activity restriction. This may be needed until the bleeding stops.        Follow these instructions at home:    •Stay on bed rest if told to do so by your health care provider.      • Do not lift anything that is heavier than 10 lbs. (4.5 kg) or as told by your health care provider.      • Do not use any products that contain nicotine or tobacco, such as cigarettes and e-cigarettes. If you need help quitting, ask your health care provider.      •Track and write down the number of pads you use each day and how soaked (saturated) they are.      • Do not use tampons.      •Keep all follow-up visits as told by your health care provider. This is important. Your health care provider may ask you to have follow-up blood tests or ultrasound tests or both.        Contact a health care provider if:    •You have any vaginal bleeding.      •You have a fever.        Get help right away if:    •You have severe cramps in your stomach, back, abdomen, or pelvis.      •You pass large clots or tissue. Save any tissue for your health care provider to look at.      •You have more vaginal bleeding, and you faint or become lightheaded or weak.        Summary    •A subchorionic hematoma is a gathering of blood between the outer wall of the placenta and the uterus.      •This condition can cause vaginal bleeding.      •Sometimes you may have no symptoms and the bleeding may only be seen when ultrasound images are taken.      •Treatment may include watchful waiting, medicines, or activity restriction.      This information is not intended to replace advice given to you by your health care provider. Make sure you discuss any questions you have with your health care provider.

## 2022-07-07 NOTE — ED PROVIDER NOTE - CARDIAC, MLM
07/07/2022  Tessa Enriquez is a 63 y.o., female.    Pre-op Assessment    I have reviewed the Patient Summary Reports.    I have reviewed the Nursing Notes. I have reviewed the NPO Status.   I have reviewed the Medications.     Review of Systems  Anesthesia Hx:  No problems with previous Anesthesia  Denies Family Hx of Anesthesia complications.   Denies Personal Hx of Anesthesia complications.   Social:  Former Smoker    Hematology/Oncology:         -- Cancer in past history (colon CA):   Cardiovascular:   hyperlipidemia    Pulmonary:   COPD, moderate Acute hypoxemic resp failure  Home O2   Renal/:  Renal/ Normal     Musculoskeletal:   Arthritis     Neurological:  Neurology Normal    Endocrine:   Hypothyroidism        Physical Exam  General:  Well nourished and Obesity      Airway/Jaw/Neck:  Airway Findings: Mouth Opening: Normal   Tongue: Normal   General Airway Assessment: Adult Oropharynx Findings:  Mallampati: II  Jaw/Neck Findings:  Neck ROM: Normal ROM      Eyes/Ears/Nose:  Eyes/Ears/Nose Findings:    Dental:  Dental Findings: Dentures     Chest/Lungs:  Chest/Lungs Findings: Normal Respiratory Rate      Heart/Vascular:  Heart Findings: Rate: Normal  Rhythm: Regular Rhythm        Mental Status:  Mental Status Findings:  Cooperative, Alert and Oriented         Anesthesia Plan  Type of Anesthesia, risks & benefits discussed:  Anesthesia Type:  Gen Natural Airway    Patient's Preference:   Plan Factors:          Intra-op Monitoring Plan: Standard ASA Monitors  Intra-op Monitoring Plan Comments:   Post Op Pain Control Plan: multimodal analgesia  Post Op Pain Control Plan Comments:     Induction:   IV  Beta Blocker:  Patient is not currently on a Beta-Blocker (No further documentation required).       Informed Consent: Informed consent signed with the Patient and all parties understand the risks and  agree with anesthesia plan.  All questions answered.  Anesthesia consent signed with patient.  ASA Score: 3     Day of Surgery Review of History & Physical:    H&P Update referred to the surgeon/provider.          Ready For Surgery From Anesthesia Perspective.           Physical Exam  General: Well nourished and Obesity    Airway:  Mallampati: II   Mouth Opening: Normal  Tongue: Normal  Neck ROM: Normal ROM    Dental:  Dentures    Chest/Lungs:  Normal Respiratory Rate    Heart:  Rate: Normal  Rhythm: Regular Rhythm          Anesthesia Plan  Type of Anesthesia, risks & benefits discussed:    Anesthesia Type: Gen Natural Airway  Intra-op Monitoring Plan: Standard ASA Monitors  Post Op Pain Control Plan: multimodal analgesia  Induction:  IV  Informed Consent: Informed consent signed with the Patient and all parties understand the risks and agree with anesthesia plan.  All questions answered.   ASA Score: 3  Day of Surgery Review of History & Physical: H&P Update referred to the surgeon/provider.    Ready For Surgery From Anesthesia Perspective.       .       Normal rate, regular rhythm.  Heart sounds S1, S2.  No murmurs, rubs or gallops.

## 2022-10-10 ENCOUNTER — EMERGENCY (EMERGENCY)
Facility: HOSPITAL | Age: 37
LOS: 1 days | Discharge: LEFT BEFORE DISCHARGE | End: 2022-10-10
Attending: EMERGENCY MEDICINE | Admitting: EMERGENCY MEDICINE
Payer: COMMERCIAL

## 2022-10-10 VITALS
DIASTOLIC BLOOD PRESSURE: 105 MMHG | SYSTOLIC BLOOD PRESSURE: 164 MMHG | RESPIRATION RATE: 18 BRPM | OXYGEN SATURATION: 99 % | WEIGHT: 220.02 LBS | HEART RATE: 84 BPM | HEIGHT: 65 IN | TEMPERATURE: 99 F

## 2022-10-10 VITALS
TEMPERATURE: 99 F | DIASTOLIC BLOOD PRESSURE: 83 MMHG | OXYGEN SATURATION: 98 % | SYSTOLIC BLOOD PRESSURE: 162 MMHG | HEART RATE: 83 BPM | RESPIRATION RATE: 18 BRPM

## 2022-10-10 LAB
ALBUMIN SERPL ELPH-MCNC: 4.4 G/DL — SIGNIFICANT CHANGE UP (ref 3.3–5)
ALP SERPL-CCNC: 73 U/L — SIGNIFICANT CHANGE UP (ref 40–120)
ALT FLD-CCNC: 14 U/L — SIGNIFICANT CHANGE UP (ref 10–45)
ANION GAP SERPL CALC-SCNC: 11 MMOL/L — SIGNIFICANT CHANGE UP (ref 5–17)
APPEARANCE UR: CLEAR — SIGNIFICANT CHANGE UP
AST SERPL-CCNC: 16 U/L — SIGNIFICANT CHANGE UP (ref 10–40)
BACTERIA # UR AUTO: SIGNIFICANT CHANGE UP /HPF
BASOPHILS # BLD AUTO: 0.02 K/UL — SIGNIFICANT CHANGE UP (ref 0–0.2)
BASOPHILS NFR BLD AUTO: 0.2 % — SIGNIFICANT CHANGE UP (ref 0–2)
BILIRUB SERPL-MCNC: 0.3 MG/DL — SIGNIFICANT CHANGE UP (ref 0.2–1.2)
BILIRUB UR-MCNC: NEGATIVE — SIGNIFICANT CHANGE UP
BLD GP AB SCN SERPL QL: NEGATIVE — SIGNIFICANT CHANGE UP
BUN SERPL-MCNC: 12 MG/DL — SIGNIFICANT CHANGE UP (ref 7–23)
CALCIUM SERPL-MCNC: 9.6 MG/DL — SIGNIFICANT CHANGE UP (ref 8.4–10.5)
CHLORIDE SERPL-SCNC: 102 MMOL/L — SIGNIFICANT CHANGE UP (ref 96–108)
CO2 SERPL-SCNC: 25 MMOL/L — SIGNIFICANT CHANGE UP (ref 22–31)
COLOR SPEC: YELLOW — SIGNIFICANT CHANGE UP
COMMENT - URINE: SIGNIFICANT CHANGE UP
CREAT SERPL-MCNC: 0.66 MG/DL — SIGNIFICANT CHANGE UP (ref 0.5–1.3)
DIFF PNL FLD: NEGATIVE — SIGNIFICANT CHANGE UP
EGFR: 115 ML/MIN/1.73M2 — SIGNIFICANT CHANGE UP
EOSINOPHIL # BLD AUTO: 0.05 K/UL — SIGNIFICANT CHANGE UP (ref 0–0.5)
EOSINOPHIL NFR BLD AUTO: 0.5 % — SIGNIFICANT CHANGE UP (ref 0–6)
EPI CELLS # UR: SIGNIFICANT CHANGE UP /HPF (ref 0–5)
GLUCOSE SERPL-MCNC: 97 MG/DL — SIGNIFICANT CHANGE UP (ref 70–99)
GLUCOSE UR QL: NEGATIVE — SIGNIFICANT CHANGE UP
HCG SERPL-ACNC: HIGH MIU/ML
HCT VFR BLD CALC: 34.7 % — SIGNIFICANT CHANGE UP (ref 34.5–45)
HGB BLD-MCNC: 12.1 G/DL — SIGNIFICANT CHANGE UP (ref 11.5–15.5)
IMM GRANULOCYTES NFR BLD AUTO: 0.2 % — SIGNIFICANT CHANGE UP (ref 0–0.9)
KETONES UR-MCNC: ABNORMAL MG/DL
LEUKOCYTE ESTERASE UR-ACNC: NEGATIVE — SIGNIFICANT CHANGE UP
LYMPHOCYTES # BLD AUTO: 2.03 K/UL — SIGNIFICANT CHANGE UP (ref 1–3.3)
LYMPHOCYTES # BLD AUTO: 20.7 % — SIGNIFICANT CHANGE UP (ref 13–44)
MCHC RBC-ENTMCNC: 31.8 PG — SIGNIFICANT CHANGE UP (ref 27–34)
MCHC RBC-ENTMCNC: 34.9 GM/DL — SIGNIFICANT CHANGE UP (ref 32–36)
MCV RBC AUTO: 91.3 FL — SIGNIFICANT CHANGE UP (ref 80–100)
MONOCYTES # BLD AUTO: 0.53 K/UL — SIGNIFICANT CHANGE UP (ref 0–0.9)
MONOCYTES NFR BLD AUTO: 5.4 % — SIGNIFICANT CHANGE UP (ref 2–14)
NEUTROPHILS # BLD AUTO: 7.15 K/UL — SIGNIFICANT CHANGE UP (ref 1.8–7.4)
NEUTROPHILS NFR BLD AUTO: 73 % — SIGNIFICANT CHANGE UP (ref 43–77)
NITRITE UR-MCNC: NEGATIVE — SIGNIFICANT CHANGE UP
NRBC # BLD: 0 /100 WBCS — SIGNIFICANT CHANGE UP (ref 0–0)
PH UR: 6 — SIGNIFICANT CHANGE UP (ref 5–8)
PLATELET # BLD AUTO: 244 K/UL — SIGNIFICANT CHANGE UP (ref 150–400)
POTASSIUM SERPL-MCNC: 3.8 MMOL/L — SIGNIFICANT CHANGE UP (ref 3.5–5.3)
POTASSIUM SERPL-SCNC: 3.8 MMOL/L — SIGNIFICANT CHANGE UP (ref 3.5–5.3)
PROT SERPL-MCNC: 7.9 G/DL — SIGNIFICANT CHANGE UP (ref 6–8.3)
PROT UR-MCNC: ABNORMAL MG/DL
RBC # BLD: 3.8 M/UL — SIGNIFICANT CHANGE UP (ref 3.8–5.2)
RBC # FLD: 13.1 % — SIGNIFICANT CHANGE UP (ref 10.3–14.5)
RBC CASTS # UR COMP ASSIST: < 5 /HPF — SIGNIFICANT CHANGE UP
RH IG SCN BLD-IMP: POSITIVE — SIGNIFICANT CHANGE UP
SARS-COV-2 RNA SPEC QL NAA+PROBE: NEGATIVE — SIGNIFICANT CHANGE UP
SODIUM SERPL-SCNC: 138 MMOL/L — SIGNIFICANT CHANGE UP (ref 135–145)
SP GR SPEC: >=1.03 — SIGNIFICANT CHANGE UP (ref 1–1.03)
UROBILINOGEN FLD QL: 0.2 E.U./DL — SIGNIFICANT CHANGE UP
WBC # BLD: 9.8 K/UL — SIGNIFICANT CHANGE UP (ref 3.8–10.5)
WBC # FLD AUTO: 9.8 K/UL — SIGNIFICANT CHANGE UP (ref 3.8–10.5)
WBC UR QL: < 5 /HPF — SIGNIFICANT CHANGE UP

## 2022-10-10 PROCEDURE — 76817 TRANSVAGINAL US OBSTETRIC: CPT | Mod: 26

## 2022-10-10 PROCEDURE — 86900 BLOOD TYPING SEROLOGIC ABO: CPT

## 2022-10-10 PROCEDURE — 81001 URINALYSIS AUTO W/SCOPE: CPT

## 2022-10-10 PROCEDURE — 86901 BLOOD TYPING SEROLOGIC RH(D): CPT

## 2022-10-10 PROCEDURE — 84702 CHORIONIC GONADOTROPIN TEST: CPT

## 2022-10-10 PROCEDURE — 87635 SARS-COV-2 COVID-19 AMP PRB: CPT

## 2022-10-10 PROCEDURE — 76817 TRANSVAGINAL US OBSTETRIC: CPT

## 2022-10-10 PROCEDURE — 99284 EMERGENCY DEPT VISIT MOD MDM: CPT | Mod: 25

## 2022-10-10 PROCEDURE — 76801 OB US < 14 WKS SINGLE FETUS: CPT | Mod: 26

## 2022-10-10 PROCEDURE — 76830 TRANSVAGINAL US NON-OB: CPT

## 2022-10-10 PROCEDURE — 85025 COMPLETE CBC W/AUTO DIFF WBC: CPT

## 2022-10-10 PROCEDURE — 36415 COLL VENOUS BLD VENIPUNCTURE: CPT

## 2022-10-10 PROCEDURE — 86850 RBC ANTIBODY SCREEN: CPT

## 2022-10-10 PROCEDURE — 80053 COMPREHEN METABOLIC PANEL: CPT

## 2022-10-10 PROCEDURE — 76801 OB US < 14 WKS SINGLE FETUS: CPT

## 2022-10-10 PROCEDURE — 99285 EMERGENCY DEPT VISIT HI MDM: CPT

## 2022-10-10 NOTE — ED PROVIDER NOTE - PHYSICAL EXAMINATION
CONSTITUTIONAL: NAD   SKIN: Normal color and turgor.    HEAD: NC/AT.  EYES: Conjunctiva clear. EOMI. PERRL.    ENT: Airway clear. Normal voice.   RESPIRATORY:  Normal work of breathing. Lungs CTAB.  CARDIOVASCULAR:  RRR, S1S2. No M/R/G.      GI:  Abdomen soft, nontender.    : No CVAT.  Pelvic chaperoned by NICOLE Bronson: cervix closed, no CMT. No blood in vault. No CMT, no adnexal tenderness/mass.  MSK: Neck supple.  No LE edema or calf tenderness. No joint swelling or ROM limitation.  NEURO: Alert; CN: grossly intact. Speech clear.  GUILLEN. Gait steady.

## 2022-10-10 NOTE — ED PROVIDER NOTE - NSFOLLOWUPINSTRUCTIONS_ED_ALL_ED_FT
Please follow up with OB-GYN this week. Call today to schedule appointment: [phone]  If you have any difficulty getting an appointment, call the ED Referral Coordinator at [phone]    Return to the Emergency Department if you have any new or worsening symptoms, or if you have any concerns.  ====================    Threatened Miscarriage    WHAT YOU NEED TO KNOW:    A threatened miscarriage occurs when you have vaginal bleeding within the first 20 weeks of pregnancy. It means that a miscarriage may happen. A threatened miscarriage may also be called a threatened .    DISCHARGE INSTRUCTIONS:    Return to the emergency department if:   •You feel weak or faint.      •Your pain or cramping in your abdomen or back gets worse.      •You have vaginal bleeding that soaks 1 or more pads in an hour.      •You pass material that looks like tissue or large clots.      Call your doctor or obstetrician if:   •You have a fever.      •You have trouble urinating, burning when you urinate, or feel a need to urinate often.      •You have new or worsening vaginal bleeding.      •You have vaginal pain or itching, or vaginal discharge that is yellow, green, or foul-smelling.      •You have questions or concerns about your condition or care.      Self-care: The following may help you manage your symptoms and decrease your risk for a miscarriage:  •Do not put anything in your vagina. Do not have sex, douche, or use tampons. These actions may increase your risk for infection and miscarriage.      •Rest as directed. Do not exercise or do strenuous activities. These activities may cause  labor or miscarriage. Ask your healthcare provider what activities are okay to do.      Stay healthy during pregnancy:   •Eat a variety of healthy foods. Healthy foods can help you get extra protein, water, and calories that you need while you are pregnant. Healthy foods include fruits, vegetables, whole-grain breads, low-fat dairy products, beans, lean meats, and fish. Avoid raw or undercooked meat and fish. Ask your healthcare provider if you need a special diet.      •Take prenatal vitamins as directed. These help you get the right amount of vitamins and minerals. They may also decrease the risk of certain birth defects.      •Do not drink alcohol or use illegal drugs. These can increase your risk for a miscarriage or harm your baby.      •Do not smoke. Nicotine and other chemicals in cigarettes and cigars can harm your baby and cause miscarriage or  labor. Ask your healthcare provider for information if you currently smoke and need help to quit. E-cigarettes or smokeless tobacco still contain nicotine. Do not use these products.      •Decrease your risk for an infection. Always wash your hands before eating or preparing meals. Do not spend time with people who are sick. Ask your healthcare provider if you need immunizations such as the flu or hepatitis B vaccine. Immunizations may decrease your risk for infections that could cause a miscarriage.      •Manage your medical conditions. Keep your blood pressure and blood sugars under control. Maintain a healthy weight during pregnancy.      Follow up with your doctor or obstetrician as directed: You may need to see your obstetrician frequently for ultrasounds or blood tests. Write down your questions so you remember to ask them during your visits. Please follow up with OB-GYN this week. Call today to schedule appointment: [phone]  They are located at [address] [place] [address]    Please follow up with your primary care physician for blood pressure check this week.    If you have any difficulty getting an appointment, call the ED Referral Coordinator at [phone]    Return to the Emergency Department if you have any new or worsening symptoms, or if you have any concerns.  ====================    Threatened Miscarriage    WHAT YOU NEED TO KNOW:    A threatened miscarriage occurs when you have vaginal bleeding within the first 20 weeks of pregnancy. It means that a miscarriage may happen. A threatened miscarriage may also be called a threatened .    DISCHARGE INSTRUCTIONS:    Return to the emergency department if:   •You feel weak or faint.      •Your pain or cramping in your abdomen or back gets worse.      •You have vaginal bleeding that soaks 1 or more pads in an hour.      •You pass material that looks like tissue or large clots.      Call your doctor or obstetrician if:   •You have a fever.      •You have trouble urinating, burning when you urinate, or feel a need to urinate often.      •You have new or worsening vaginal bleeding.      •You have vaginal pain or itching, or vaginal discharge that is yellow, green, or foul-smelling.      •You have questions or concerns about your condition or care.      Self-care: The following may help you manage your symptoms and decrease your risk for a miscarriage:  •Do not put anything in your vagina. Do not have sex, douche, or use tampons. These actions may increase your risk for infection and miscarriage.      •Rest as directed. Do not exercise or do strenuous activities. These activities may cause  labor or miscarriage. Ask your healthcare provider what activities are okay to do.      Stay healthy during pregnancy:   •Eat a variety of healthy foods. Healthy foods can help you get extra protein, water, and calories that you need while you are pregnant. Healthy foods include fruits, vegetables, whole-grain breads, low-fat dairy products, beans, lean meats, and fish. Avoid raw or undercooked meat and fish. Ask your healthcare provider if you need a special diet.      •Take prenatal vitamins as directed. These help you get the right amount of vitamins and minerals. They may also decrease the risk of certain birth defects.      •Do not drink alcohol or use illegal drugs. These can increase your risk for a miscarriage or harm your baby.      •Do not smoke. Nicotine and other chemicals in cigarettes and cigars can harm your baby and cause miscarriage or  labor. Ask your healthcare provider for information if you currently smoke and need help to quit. E-cigarettes or smokeless tobacco still contain nicotine. Do not use these products.      •Decrease your risk for an infection. Always wash your hands before eating or preparing meals. Do not spend time with people who are sick. Ask your healthcare provider if you need immunizations such as the flu or hepatitis B vaccine. Immunizations may decrease your risk for infections that could cause a miscarriage.      •Manage your medical conditions. Keep your blood pressure and blood sugars under control. Maintain a healthy weight during pregnancy.      Follow up with your doctor or obstetrician as directed: You may need to see your obstetrician frequently for ultrasounds or blood tests. Write down your questions so you remember to ask them during your visits. Please follow up with OB-GYN this week. Call today to schedule appointment: [phone]  They are located at [address] [place] [address]    Please follow up with your primary care physician for blood pressure check this week.  Please discuss whether to continue hydrochlorothiazide or possibly switch to a new blood pressure medication.    If you have any difficulty getting an appointment, call the ED Referral Coordinator at [phone]    Return to the Emergency Department if you have any new or worsening symptoms, or if you have any concerns.  ====================    Threatened Miscarriage    WHAT YOU NEED TO KNOW:    A threatened miscarriage occurs when you have vaginal bleeding within the first 20 weeks of pregnancy. It means that a miscarriage may happen. A threatened miscarriage may also be called a threatened .    DISCHARGE INSTRUCTIONS:    Return to the emergency department if:   •You feel weak or faint.      •Your pain or cramping in your abdomen or back gets worse.      •You have vaginal bleeding that soaks 1 or more pads in an hour.      •You pass material that looks like tissue or large clots.      Call your doctor or obstetrician if:   •You have a fever.      •You have trouble urinating, burning when you urinate, or feel a need to urinate often.      •You have new or worsening vaginal bleeding.      •You have vaginal pain or itching, or vaginal discharge that is yellow, green, or foul-smelling.      •You have questions or concerns about your condition or care.      Self-care: The following may help you manage your symptoms and decrease your risk for a miscarriage:  •Do not put anything in your vagina. Do not have sex, douche, or use tampons. These actions may increase your risk for infection and miscarriage.      •Rest as directed. Do not exercise or do strenuous activities. These activities may cause  labor or miscarriage. Ask your healthcare provider what activities are okay to do.      Stay healthy during pregnancy:   •Eat a variety of healthy foods. Healthy foods can help you get extra protein, water, and calories that you need while you are pregnant. Healthy foods include fruits, vegetables, whole-grain breads, low-fat dairy products, beans, lean meats, and fish. Avoid raw or undercooked meat and fish. Ask your healthcare provider if you need a special diet.      •Take prenatal vitamins as directed. These help you get the right amount of vitamins and minerals. They may also decrease the risk of certain birth defects.      •Do not drink alcohol or use illegal drugs. These can increase your risk for a miscarriage or harm your baby.      •Do not smoke. Nicotine and other chemicals in cigarettes and cigars can harm your baby and cause miscarriage or  labor. Ask your healthcare provider for information if you currently smoke and need help to quit. E-cigarettes or smokeless tobacco still contain nicotine. Do not use these products.      •Decrease your risk for an infection. Always wash your hands before eating or preparing meals. Do not spend time with people who are sick. Ask your healthcare provider if you need immunizations such as the flu or hepatitis B vaccine. Immunizations may decrease your risk for infections that could cause a miscarriage.      •Manage your medical conditions. Keep your blood pressure and blood sugars under control. Maintain a healthy weight during pregnancy.      Follow up with your doctor or obstetrician as directed: You may need to see your obstetrician frequently for ultrasounds or blood tests. Write down your questions so you remember to ask them during your visits.  =================    Hypertension During Pregnancy      High blood pressure (hypertension) is when the force of blood pumping through the arteries is high enough to cause problems with your health. Arteries are blood vessels that carry blood from the heart throughout the body. Hypertension during pregnancy can cause problems for you and your baby. It can be mild or severe.    There are different types of hypertension that can happen during pregnancy. These include:  •Chronic hypertension. This happens when you had high blood pressure before you became pregnant, and it continues during the pregnancy. Hypertension that develops before you are 20 weeks pregnant and continues during the pregnancy is also called chronic hypertension. If you have chronic hypertension, it will not go away after you have your baby. You will need follow-up visits with your health care provider after you have your baby. Your health care provider may want you to keep taking medicine for your blood pressure.      •Gestational hypertension. This is hypertension that develops after the 20th week of pregnancy. Gestational hypertension usually goes away after you have your baby, but your health care provider will need to monitor your blood pressure to make sure that it is getting better.      •Postpartum hypertension. This is high blood pressure that was present before delivery and continues after delivery or that starts after delivery. This usually occurs within 48 hours after childbirth but may occur up to 6 weeks after giving birth.      When hypertension during pregnancy is severe, it is a medical emergency that requires treatment right away.      How does this affect me?    Women who have hypertension during pregnancy have a greater chance of developing hypertension later in life or during future pregnancies. In some cases, hypertension during pregnancy can cause serious complications, such as:  •Stroke.      •Heart attack.      •Injury to other organs, such as kidneys, lungs, or liver.      •Preeclampsia.      •A condition called hemolysis, elevated liver enzymes, and low platelet count (HELLP) syndrome.      •Convulsions or seizures.      •Placental abruption.        How does this affect my baby?    Hypertension during pregnancy can affect your baby. Your baby may:  •Be born early (prematurely).      •Not weigh as much as he or she should at birth (low birth weight).      •Not tolerate labor well, leading to an unplanned  delivery.      This condition may also result in a baby's death before birth (stillbirth).      What are the risks?    There are certain factors that make it more likely for you to develop hypertension during pregnancy. These include:  •Having hypertension during a previous pregnancy or a family history of hypertension.      •Being overweight.      •Being age 35 or older.      •Being pregnant for the first time.      •Being pregnant with more than one baby.      •Becoming pregnant using fertilization methods, such as IVF (in vitro fertilization).      •Having other medical problems, such as diabetes, kidney disease, or lupus.        What can I do to lower my risk?     The exact cause of hypertension during pregnancy is not known. You may be able to lower your risk by:  •Maintaining a healthy weight.      •Eating a healthy and balanced diet.      •Following your health care provider's instructions about treating any long-term conditions that you had before becoming pregnant.      It is very important to keep all of your prenatal care appointments. Your health care provider will check your blood pressure and make sure that your pregnancy is progressing as expected. If a problem is found, early treatment can prevent complications.      How is this treated?    Treatment for hypertension during pregnancy varies depending on the type of hypertension you have and how serious it is.  •If you were taking medicine for high blood pressure before you became pregnant, talk with your health care provider. You may need to change medicine during pregnancy because some medicines, like ACE inhibitors, may not be considered safe for your baby.      •If you have gestational hypertension, your health care provider may order medicine to treat this during pregnancy.      •If you are at risk for preeclampsia, your health care provider may recommend that you take a low-dose aspirin during your pregnancy.      •If you have severe hypertension, you may need to be hospitalized so you and your baby can be monitored closely. You may also need to be given medicine to lower your blood pressure.      •In some cases, if your condition gets worse, you may need to deliver your baby early.        Follow these instructions at home:      Eating and drinking      •Drink enough fluid to keep your urine pale yellow.      •Avoid caffeine.      Lifestyle     • Do not use any products that contain nicotine or tobacco. These products include cigarettes, chewing tobacco, and vaping devices, such as e-cigarettes. If you need help quitting, ask your health care provider.      • Do not use alcohol or drugs.      •Avoid stress as much as possible.      •Rest and get plenty of sleep.      •Regular exercise can help to reduce your blood pressure. Ask your health care provider what kinds of exercise are best for you.      General instructions     •Take over-the-counter and prescription medicines only as told by your health care provider.      •Keep all prenatal and follow-up visits. This is important.        Contact a health care provider if:  •You have symptoms that your health care provider told you may require more treatment or monitoring, such as:  •Headaches.      •Nausea or vomiting.      •Abdominal pain.      •Dizziness.      •Light-headedness.          Get help right away if:  •You have symptoms of serious complications, such as:  •Severe abdominal pain that does not get better with treatment.      •A severe headache that does not get better, blurred vision, or double vision.      •Vomiting that does not get better.      •Sudden, rapid weight gain or swelling in your hands, ankles, or face.      •Vaginal bleeding.       •Blood in your urine.      •Shortness of breath or chest pain.      •Weakness on one side of your body or difficulty speaking.        •Your baby is not moving as much as usual.      These symptoms may represent a serious problem that is an emergency. Do not wait to see if the symptoms will go away. Get medical help right away. Call your local emergency services (911 in the [place] Do not drive yourself to the hospital.       Summary    •Hypertension during pregnancy can cause problems for you and your baby.      •Treatment for hypertension during pregnancy varies depending on the type of hypertension you have and how serious it is.      •Keep all prenatal and follow-up visits. This is important.      •Get help right away if you have symptoms of serious complications related to high blood pressure.      This information is not intended to replace advice given to you by your health care provider. Make sure you discuss any questions you have with your health care provider.

## 2022-10-10 NOTE — ED PROVIDER NOTE - NS ED ROS FT
CONSTITUTIONAL: No fever, chills, or weakness  NEURO: No headache, no dizziness, no syncope; No focal weakness/tingling/numbness  EYES: No visual changes  ENT: No rhinorrhea or sore throat  PULM: No cough or dyspnea  CV: No chest pain or palpitations  GI:  Had nausea and vomiting a few times over the past 2 weeks, none current.  No abd pain/cramps currently. No diarrhea or blood in stool.  : HPI. No dysuria, hematuria, frequency  MSK: No neck pain or back pain, no joint pain  SKIN: no rash or unusual bruising

## 2022-10-10 NOTE — ED ADULT TRIAGE NOTE - CHIEF COMPLAINT QUOTE
Pt is , LMP "maybe late August," reports positive pregnancy test "a few days ago." Reports vaginal bleeding since yesterday, used 2 pads. Pt denies any pain.

## 2022-10-10 NOTE — ED PROVIDER NOTE - CLINICAL SUMMARY MEDICAL DECISION MAKING FREE TEXT BOX
VB in early pregnancy: DDx includes ectopic preg, threatened ab, other.  HD stable, afeb, well-appearing. Benign abd exam. Will check labs, TVUS, reassess. Has GYN appointment scheduled this week. VB in early pregnancy: DDx includes ectopic preg, threatened ab, other.  HD stable, afeb, well-appearing. Benign abd exam. Will check labs, TVUS, reassess.

## 2022-10-10 NOTE — ED PROVIDER NOTE - PATIENT PORTAL LINK FT
You can access the FollowMyHealth Patient Portal offered by [facility] by registering at the following website: [email or web address]. By joining [employer]’s FollowMyHealth portal, you will also be able to view your health information using other applications (apps) compatible with our system. You can access the FollowMyHealth Patient Portal offered by [facility] by registering at the following website: [email or web address]. By joining [employer]’s FollowMyHealth portal, you will also be able to view your health information using other applications (apps) compatible with our system.

## 2022-10-10 NOTE — ED ADULT NURSE NOTE - OBJECTIVE STATEMENT
36 y/o female c/o cramping Saturday and , stated " she had a positive pregnancy test, passed a blood clot on Saturday and the vaginal spotting for 2 days"  .

## 2022-10-10 NOTE — ED ADULT TRIAGE NOTE - CCCP TRG CHIEF CMPLNT
Cautious use as patient has had increased somnolence with additional doses of narcotics.  Patient given short course of pain medication given wound VAC and need for ongoing wound care.   pregnancy problem

## 2022-10-10 NOTE — ED PROVIDER NOTE - OBJECTIVE STATEMENT
37 f PMHx HTN states she is in early pregnancy, started having pelvic cramps and VB in the past 2 days, began day night.  Passed a single blood clot apx size of a golf ball.  She bled all day yesterday, but not heavily.  Used 2 pads, but did not saturate them.  Today only has light spotting, no pain.  Not weak or lightheaded.  LMP was in late August, but she doesn't know the date.  This was not a planned pregnancy.  .

## 2022-10-10 NOTE — ED PROVIDER NOTE - PROGRESS NOTE DETAILS
d/w radiology attending: + IUP with FH.  FH slow for dates, but technically viable pregnancy.    Recommending short-term GYN follow up. There will be a delay in report to EMR.  Patient is not willing to wait for formal report, says she needs to leave ED very soon. Informed that patient eloped prior to being discharged. Pt returned to ED.  Reviewed test results and need for close blood pressure monitoring.  She says  she has not taken her HCTZ in several days.  She will speak with her primary care provider and GYN this week to discuss whether she should continue HCTZ or perhaps switch to another medication.  Advised to return to ED for headache, vision changes, CP/SOB, edema, or any other new/worsening symptoms.

## 2022-10-10 NOTE — ED ADULT TRIAGE NOTE - OTHER COMPLAINTS
Hx of HTN, takes HCTZ "sometimes." Denies chest pain, sob, headache, dizziness. Last took HCTZ yesterday. Refusing EKG.

## 2022-10-10 NOTE — ED PROVIDER NOTE - ATTENDING APP SHARED VISIT CONTRIBUTION OF CARE
early pregnancy with bleeding/cramping intermittently x 2 days.  concern for ectopic pregnancy, threatened .  labs with rh+ blood type.  us done showing iup but low hr concerning for impending miscarriage.  abdomen soft, non tender making acute appendicitis/ other pathology unlikely. eloped prior to discussion of results.  to f/u with obgyn.

## 2022-10-13 DIAGNOSIS — Z53.29 PROCEDURE AND TREATMENT NOT CARRIED OUT BECAUSE OF PATIENT'S DECISION FOR OTHER REASONS: ICD-10-CM

## 2022-10-13 DIAGNOSIS — Z20.822 CONTACT WITH AND (SUSPECTED) EXPOSURE TO COVID-19: ICD-10-CM

## 2022-10-13 DIAGNOSIS — Z3A.00 WEEKS OF GESTATION OF PREGNANCY NOT SPECIFIED: ICD-10-CM

## 2022-10-13 DIAGNOSIS — O20.9 HEMORRHAGE IN EARLY PREGNANCY, UNSPECIFIED: ICD-10-CM

## 2022-10-13 DIAGNOSIS — Z88.8 ALLERGY STATUS TO OTHER DRUGS, MEDICAMENTS AND BIOLOGICAL SUBSTANCES STATUS: ICD-10-CM

## 2022-10-13 DIAGNOSIS — O20.0 THREATENED ABORTION: ICD-10-CM

## 2022-10-13 DIAGNOSIS — O10.919 UNSPECIFIED PRE-EXISTING HYPERTENSION COMPLICATING PREGNANCY, UNSPECIFIED TRIMESTER: ICD-10-CM

## 2022-11-11 NOTE — ED ADULT NURSE NOTE - CINV DISCH TEACH PARTICIP
Faxed completed form       Placed in "to be scanned" bin
From completed and returned on 11/10/22
Medication Clearance    Habib   Placed in [facility] clinical folder
Patient

## 2024-05-02 NOTE — ED ADULT NURSE NOTE - NS ED NURSE LEVEL OF CONSCIOUSNESS ORIENTATION
Oriented - self; Oriented - place; Oriented - time Bill For Surgical Tray: no Performing Laboratory: -31 Expected Date Of Service: 05/02/2024 Billing Type: Third-Party Bill